# Patient Record
Sex: FEMALE | Race: WHITE | NOT HISPANIC OR LATINO | Employment: STUDENT | URBAN - METROPOLITAN AREA
[De-identification: names, ages, dates, MRNs, and addresses within clinical notes are randomized per-mention and may not be internally consistent; named-entity substitution may affect disease eponyms.]

---
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
## 2023-01-12 ENCOUNTER — OFFICE VISIT (OUTPATIENT)
Dept: URGENT CARE | Facility: CLINIC | Age: 18
End: 2023-01-12

## 2023-01-12 VITALS
OXYGEN SATURATION: 95 % | WEIGHT: 140 LBS | DIASTOLIC BLOOD PRESSURE: 63 MMHG | TEMPERATURE: 97.5 F | HEART RATE: 107 BPM | SYSTOLIC BLOOD PRESSURE: 113 MMHG | RESPIRATION RATE: 18 BRPM | HEIGHT: 64 IN | BODY MASS INDEX: 23.9 KG/M2

## 2023-01-12 DIAGNOSIS — J02.9 PHARYNGITIS, UNSPECIFIED ETIOLOGY: Primary | ICD-10-CM

## 2023-01-12 LAB — S PYO AG THROAT QL: NEGATIVE

## 2023-01-12 RX ORDER — NORETHINDRONE ACETATE AND ETHINYL ESTRADIOL AND FERROUS FUMARATE 1MG-20(21)
KIT ORAL
COMMUNITY
Start: 2022-11-18

## 2023-01-12 RX ORDER — AZITHROMYCIN 250 MG/1
TABLET, FILM COATED ORAL
Qty: 6 TABLET | Refills: 0 | Status: SHIPPED | OUTPATIENT
Start: 2023-01-12 | End: 2023-01-16

## 2023-01-12 NOTE — PROGRESS NOTES
[facility]        NAME: Christin Henry is a 21 y o  female  : 2005    MRN: [id number]  DATE: 2023  TIME: 3:16 PM    Assessment and Plan   Pharyngitis, unspecified etiology [J02 9]  1  Pharyngitis, unspecified etiology  POCT rapid strepA    azithromycin (ZITHROMAX) 250 mg tablet            Patient Instructions   Take antibiotic as prescribed  Complete full dose of antibiotics even if symptoms begin to improve or resolve  This is very contagious; do not share drinks or food with others  Replace your toothbrush in 1-2 days to prevent reinfection  Use OTC Tylenol for fever  Your symptoms should begin to improve over the next couple days  Follow-up with your PCP in 3-5 days if symptoms worsen or do not improve  Go to ER if symptoms become severe  Follow up with PCP in 3-5 days  Proceed to  ER if symptoms worsen  Chief Complaint     Chief Complaint   Patient presents with   • Sore Throat     Two days ago started to experience a sore throat  She has a cough, phlegm, and "salivating a lot "          History of Present Illness       Patient is a 58-year-old female with no sniffing past medical history presents the office complaining of sore throat for 2 days  Pain is rated 6 out of 10  Reports subjective fevers and chills but denies congestion, cough, SOB, CP, nausea, vomiting, abdominal pain or rashes  Review of Systems   Review of Systems   Constitutional: Positive for fever  HENT: Positive for ear pain and sore throat  Negative for congestion  Respiratory: Negative for cough and shortness of breath  Cardiovascular: Negative for chest pain  Gastrointestinal: Negative for abdominal pain, diarrhea, nausea and vomiting  Neurological: Negative for dizziness, light-headedness and headaches           Current Medications       Current Outpatient Medications:   •  azithromycin (ZITHROMAX) 250 mg tablet, Take 2 tablets today then 1 tablet daily x 4 days, Disp: 6 tablet, Rfl: 0  •  Junel FE 1/20 1-20 MG-MCG per tablet, TAKE 1 TABLET BY MOUTH EVERY DAY BEGIN THE FIRST SUNDAY AFTER NEXT MENSES, Disp: , Rfl:     Current Allergies     Allergies as of 01/12/2023 - Reviewed 01/12/2023   Allergen Reaction Noted   • Penicillins  11/08/2018   • Penicillins Rash 10/25/2019            The following portions of the patient's history were reviewed and updated as appropriate: allergies, current medications, past family history, past medical history, past social history, past surgical history and problem list      Past Medical History:   Diagnosis Date   • Known health problems: none        Past Surgical History:   Procedure Laterality Date   • NO PAST SURGERIES     • WISDOM TOOTH EXTRACTION Bilateral 07/04/2022       Family History   Problem Relation Age of Onset   • No Known Problems Mother    • No Known Problems Father          Medications have been verified  Objective   /63   Pulse (!) 107   Temp 97 5 °F (36 4 °C)   Resp 18   Ht 5' 4" (1 626 m)   Wt 63 5 kg (140 lb)   SpO2 95%   BMI 24 03 kg/m²   No LMP recorded  (Menstrual status: Birth Control)  Physical Exam     Physical Exam  Vitals and nursing note reviewed  Constitutional:       Appearance: Normal appearance  She is well-developed  HENT:      Head: Normocephalic and atraumatic  Right Ear: Tympanic membrane, ear canal and external ear normal       Left Ear: Tympanic membrane, ear canal and external ear normal       Nose: Nose normal       Mouth/Throat:      Mouth: Mucous membranes are moist       Pharynx: Uvula midline  Pharyngeal swelling and posterior oropharyngeal erythema present  Tonsils: Tonsillar exudate present  No tonsillar abscesses  Eyes:      General: Lids are normal       Conjunctiva/sclera: Conjunctivae normal       Pupils: Pupils are equal, round, and reactive to light  Cardiovascular:      Rate and Rhythm: Normal rate and regular rhythm  Pulses: Normal pulses  Heart sounds: Normal heart sounds  No murmur heard  No friction rub  No gallop  Pulmonary:      Effort: Pulmonary effort is normal       Breath sounds: Normal breath sounds  No wheezing, rhonchi or rales  Musculoskeletal:         General: Normal range of motion  Cervical back: Neck supple  Lymphadenopathy:      Cervical: Cervical adenopathy present  Skin:     General: Skin is warm and dry  Capillary Refill: Capillary refill takes less than 2 seconds  Neurological:      Mental Status: She is alert         POC rapid strep negative

## 2023-05-26 ENCOUNTER — OFFICE VISIT (OUTPATIENT)
Dept: URGENT CARE | Facility: CLINIC | Age: 18
End: 2023-05-26

## 2023-05-26 VITALS
TEMPERATURE: 97.6 F | SYSTOLIC BLOOD PRESSURE: 129 MMHG | HEART RATE: 105 BPM | DIASTOLIC BLOOD PRESSURE: 60 MMHG | HEIGHT: 64 IN | OXYGEN SATURATION: 99 % | RESPIRATION RATE: 18 BRPM | WEIGHT: 135 LBS | BODY MASS INDEX: 23.05 KG/M2

## 2023-05-26 DIAGNOSIS — J02.9 PHARYNGITIS, UNSPECIFIED ETIOLOGY: Primary | ICD-10-CM

## 2023-05-26 RX ORDER — AZITHROMYCIN 250 MG/1
TABLET, FILM COATED ORAL
Qty: 6 TABLET | Refills: 0 | Status: SHIPPED | OUTPATIENT
Start: 2023-05-26 | End: 2023-05-30

## 2023-05-26 NOTE — PROGRESS NOTES
[facility]        NAME: Ranjit Lizarraga is a 21 y o  female  : 2005    MRN: [id number]  DATE: May 26, 2023  TIME: 10:27 AM    Assessment and Plan   Pharyngitis, unspecified etiology [J02 9]  1  Pharyngitis, unspecified etiology  azithromycin (ZITHROMAX) 250 mg tablet    CANCELED: POCT rapid strepA        tx based on HPI and PE    Patient Instructions   Take antibiotic as prescribed  Complete full dose of antibiotics even if symptoms begin to improve or resolve  This is very contagious; do not share drinks or food with others  Replace your toothbrush in 1-2 days to prevent reinfection  Use OTC Tylenol for fever  Your symptoms should begin to improve over the next couple days  Follow up with PCP in 3-5 days  Proceed to  ER if symptoms worsen  Chief Complaint     Chief Complaint   Patient presents with   • Sore Throat     Pt reports sore throat for the past 2 days         History of Present Illness       58-year-old female with no significant past medical history presents the office complaining of sore throat for 2 days  Pain is rated 7 out of 10  Denies fevers, other URI symptoms, cough, nausea, vomit, abdominal pain, or rashes  Pain well controlled with ibuprofen  Review of Systems   Review of Systems   Constitutional: Negative for chills and fever  HENT: Positive for sore throat  Negative for congestion  Respiratory: Negative for cough and shortness of breath  Cardiovascular: Negative for chest pain and palpitations  Gastrointestinal: Negative for abdominal pain, diarrhea, nausea and vomiting  Musculoskeletal: Negative for myalgias  Neurological: Negative for dizziness, light-headedness and headaches           Current Medications       Current Outpatient Medications:   •  azithromycin (ZITHROMAX) 250 mg tablet, Take 2 tablets today then 1 tablet daily x 4 days, Disp: 6 tablet, Rfl: 0  •  Junel FE 1/20 1-20 MG-MCG per tablet, TAKE 1 TABLET BY MOUTH EVERY DAY BEGIN "THE FIRST SUNDAY AFTER NEXT MENSES, Disp: , Rfl:     Current Allergies     Allergies as of 05/26/2023 - Reviewed 05/26/2023   Allergen Reaction Noted   • Penicillins  11/08/2018   • Penicillins Rash 10/25/2019            The following portions of the patient's history were reviewed and updated as appropriate: allergies, current medications, past family history, past medical history, past social history, past surgical history and problem list      Past Medical History:   Diagnosis Date   • Known health problems: none        Past Surgical History:   Procedure Laterality Date   • NO PAST SURGERIES     • WISDOM TOOTH EXTRACTION Bilateral 07/04/2022       Family History   Problem Relation Age of Onset   • No Known Problems Mother    • No Known Problems Father          Medications have been verified  Objective   /60   Pulse 105   Temp 97 6 °F (36 4 °C)   Resp 18   Ht 5' 4\" (1 626 m)   Wt 61 2 kg (135 lb)   SpO2 99%   BMI 23 17 kg/m²   No LMP recorded  (Menstrual status: Birth Control)  Physical Exam     Physical Exam  Vitals and nursing note reviewed  Constitutional:       Appearance: Normal appearance  She is well-developed  HENT:      Head: Normocephalic and atraumatic  Right Ear: Tympanic membrane, ear canal and external ear normal       Left Ear: Tympanic membrane, ear canal and external ear normal       Nose: Nose normal       Mouth/Throat:      Mouth: Mucous membranes are moist       Pharynx: Uvula midline  Pharyngeal swelling and posterior oropharyngeal erythema present  Tonsils: Tonsillar exudate present  No tonsillar abscesses  Eyes:      General: Lids are normal       Conjunctiva/sclera: Conjunctivae normal       Pupils: Pupils are equal, round, and reactive to light  Cardiovascular:      Rate and Rhythm: Normal rate and regular rhythm  Pulses: Normal pulses  Heart sounds: Normal heart sounds  No murmur heard  No friction rub  No gallop     Pulmonary:      " Effort: Pulmonary effort is normal       Breath sounds: Normal breath sounds  No wheezing, rhonchi or rales  Musculoskeletal:         General: Normal range of motion  Cervical back: Neck supple  Lymphadenopathy:      Cervical: No cervical adenopathy  Skin:     General: Skin is warm and dry  Capillary Refill: Capillary refill takes less than 2 seconds  Neurological:      Mental Status: She is alert           POC rapid strep negative